# Patient Record
Sex: FEMALE | Race: WHITE | NOT HISPANIC OR LATINO | Employment: STUDENT | URBAN - NONMETROPOLITAN AREA
[De-identification: names, ages, dates, MRNs, and addresses within clinical notes are randomized per-mention and may not be internally consistent; named-entity substitution may affect disease eponyms.]

---

## 2021-05-28 ENCOUNTER — IMMUNIZATION (OUTPATIENT)
Dept: VACCINE CLINIC | Facility: HOSPITAL | Age: 21
End: 2021-05-28

## 2021-05-28 PROCEDURE — 91300 HC SARSCOV02 VAC 30MCG/0.3ML IM: CPT | Performed by: INTERNAL MEDICINE

## 2021-05-28 PROCEDURE — 0001A: CPT | Performed by: INTERNAL MEDICINE

## 2021-06-15 ENCOUNTER — IMMUNIZATION (OUTPATIENT)
Dept: VACCINE CLINIC | Facility: HOSPITAL | Age: 21
End: 2021-06-15

## 2021-06-15 PROCEDURE — 91300 HC SARSCOV02 VAC 30MCG/0.3ML IM: CPT | Performed by: INTERNAL MEDICINE

## 2021-06-15 PROCEDURE — 0002A: CPT | Performed by: INTERNAL MEDICINE

## 2023-08-08 ENCOUNTER — OFFICE VISIT (OUTPATIENT)
Dept: FAMILY MEDICINE CLINIC | Facility: CLINIC | Age: 23
End: 2023-08-08
Payer: COMMERCIAL

## 2023-08-08 VITALS
SYSTOLIC BLOOD PRESSURE: 130 MMHG | WEIGHT: 188.8 LBS | HEART RATE: 92 BPM | TEMPERATURE: 98.2 F | OXYGEN SATURATION: 97 % | HEIGHT: 63 IN | BODY MASS INDEX: 33.45 KG/M2 | DIASTOLIC BLOOD PRESSURE: 80 MMHG

## 2023-08-08 DIAGNOSIS — R19.7 DIARRHEA, UNSPECIFIED TYPE: Primary | ICD-10-CM

## 2023-08-08 PROCEDURE — 86258 DGP ANTIBODY EACH IG CLASS: CPT | Performed by: FAMILY MEDICINE

## 2023-08-08 PROCEDURE — 99204 OFFICE O/P NEW MOD 45 MIN: CPT | Performed by: FAMILY MEDICINE

## 2023-08-08 PROCEDURE — 86364 TISS TRNSGLTMNASE EA IG CLAS: CPT | Performed by: FAMILY MEDICINE

## 2023-08-08 PROCEDURE — 82306 VITAMIN D 25 HYDROXY: CPT | Performed by: FAMILY MEDICINE

## 2023-08-08 PROCEDURE — 83013 H PYLORI (C-13) BREATH: CPT | Performed by: FAMILY MEDICINE

## 2023-08-08 PROCEDURE — 80050 GENERAL HEALTH PANEL: CPT | Performed by: FAMILY MEDICINE

## 2023-08-08 PROCEDURE — 36415 COLL VENOUS BLD VENIPUNCTURE: CPT | Performed by: FAMILY MEDICINE

## 2023-08-08 PROCEDURE — 83036 HEMOGLOBIN GLYCOSYLATED A1C: CPT | Performed by: FAMILY MEDICINE

## 2023-08-08 PROCEDURE — 84439 ASSAY OF FREE THYROXINE: CPT | Performed by: FAMILY MEDICINE

## 2023-08-08 PROCEDURE — 82784 ASSAY IGA/IGD/IGG/IGM EACH: CPT | Performed by: FAMILY MEDICINE

## 2023-08-08 PROCEDURE — 83540 ASSAY OF IRON: CPT | Performed by: FAMILY MEDICINE

## 2023-08-08 PROCEDURE — 82728 ASSAY OF FERRITIN: CPT | Performed by: FAMILY MEDICINE

## 2023-08-08 PROCEDURE — 84466 ASSAY OF TRANSFERRIN: CPT | Performed by: FAMILY MEDICINE

## 2023-08-08 RX ORDER — TRIAMCINOLONE ACETONIDE 1 MG/G
1 CREAM TOPICAL 2 TIMES DAILY
Qty: 45 G | Refills: 1 | Status: SHIPPED | OUTPATIENT
Start: 2023-08-08

## 2023-08-09 LAB
25(OH)D3 SERPL-MCNC: 40 NG/ML (ref 30–100)
ALBUMIN SERPL-MCNC: 4.4 G/DL (ref 3.5–5.2)
ALBUMIN/GLOB SERPL: 1.8 G/DL
ALP SERPL-CCNC: 68 U/L (ref 39–117)
ALT SERPL W P-5'-P-CCNC: 38 U/L (ref 1–33)
ANION GAP SERPL CALCULATED.3IONS-SCNC: 10 MMOL/L (ref 5–15)
AST SERPL-CCNC: 57 U/L (ref 1–32)
BASOPHILS # BLD AUTO: 0.04 10*3/MM3 (ref 0–0.2)
BASOPHILS NFR BLD AUTO: 0.5 % (ref 0–1.5)
BILIRUB SERPL-MCNC: <0.2 MG/DL (ref 0–1.2)
BUN SERPL-MCNC: 14 MG/DL (ref 6–20)
BUN/CREAT SERPL: 17.5 (ref 7–25)
CALCIUM SPEC-SCNC: 9.4 MG/DL (ref 8.6–10.5)
CHLORIDE SERPL-SCNC: 103 MMOL/L (ref 98–107)
CO2 SERPL-SCNC: 26 MMOL/L (ref 22–29)
CREAT SERPL-MCNC: 0.8 MG/DL (ref 0.57–1)
DEPRECATED RDW RBC AUTO: 39.9 FL (ref 37–54)
EGFRCR SERPLBLD CKD-EPI 2021: 106.3 ML/MIN/1.73
EOSINOPHIL # BLD AUTO: 0.13 10*3/MM3 (ref 0–0.4)
EOSINOPHIL NFR BLD AUTO: 1.6 % (ref 0.3–6.2)
ERYTHROCYTE [DISTWIDTH] IN BLOOD BY AUTOMATED COUNT: 12 % (ref 12.3–15.4)
FERRITIN SERPL-MCNC: 30.6 NG/ML (ref 13–150)
GLOBULIN UR ELPH-MCNC: 2.5 GM/DL
GLUCOSE SERPL-MCNC: 78 MG/DL (ref 65–99)
HBA1C MFR BLD: 5.1 % (ref 4.8–5.6)
HCT VFR BLD AUTO: 40.7 % (ref 34–46.6)
HGB BLD-MCNC: 13.7 G/DL (ref 12–15.9)
IMM GRANULOCYTES # BLD AUTO: 0.02 10*3/MM3 (ref 0–0.05)
IMM GRANULOCYTES NFR BLD AUTO: 0.2 % (ref 0–0.5)
IRON 24H UR-MRATE: 54 MCG/DL (ref 37–145)
IRON SATN MFR SERPL: 12 % (ref 20–50)
LYMPHOCYTES # BLD AUTO: 1.57 10*3/MM3 (ref 0.7–3.1)
LYMPHOCYTES NFR BLD AUTO: 19 % (ref 19.6–45.3)
MCH RBC QN AUTO: 30.5 PG (ref 26.6–33)
MCHC RBC AUTO-ENTMCNC: 33.7 G/DL (ref 31.5–35.7)
MCV RBC AUTO: 90.6 FL (ref 79–97)
MONOCYTES # BLD AUTO: 0.45 10*3/MM3 (ref 0.1–0.9)
MONOCYTES NFR BLD AUTO: 5.5 % (ref 5–12)
NEUTROPHILS NFR BLD AUTO: 6.04 10*3/MM3 (ref 1.7–7)
NEUTROPHILS NFR BLD AUTO: 73.2 % (ref 42.7–76)
NRBC BLD AUTO-RTO: 0 /100 WBC (ref 0–0.2)
PLATELET # BLD AUTO: 278 10*3/MM3 (ref 140–450)
PMV BLD AUTO: 12.5 FL (ref 6–12)
POTASSIUM SERPL-SCNC: 4.1 MMOL/L (ref 3.5–5.2)
PROT SERPL-MCNC: 6.9 G/DL (ref 6–8.5)
RBC # BLD AUTO: 4.49 10*6/MM3 (ref 3.77–5.28)
SODIUM SERPL-SCNC: 139 MMOL/L (ref 136–145)
T4 FREE SERPL-MCNC: 1.05 NG/DL (ref 0.93–1.7)
TIBC SERPL-MCNC: 454 MCG/DL (ref 298–536)
TRANSFERRIN SERPL-MCNC: 305 MG/DL (ref 200–360)
TSH SERPL DL<=0.05 MIU/L-ACNC: 1.67 UIU/ML (ref 0.27–4.2)
WBC NRBC COR # BLD: 8.25 10*3/MM3 (ref 3.4–10.8)

## 2023-08-10 LAB
GLIADIN PEPTIDE IGA SER-ACNC: 9 UNITS (ref 0–19)
IGA SERPL-MCNC: 133 MG/DL (ref 87–352)
TTG IGA SER-ACNC: <2 U/ML (ref 0–3)

## 2023-08-10 NOTE — PROGRESS NOTES
"Chief Complaint  Establish Care    Subjective          Jodi Grayson presents to [facility]  Diarrhea   This is a chronic problem. The current episode started more than 1 year ago. The problem occurs 2 to 4 times per day. The problem has been unchanged. The stool consistency is described as Watery. Associated symptoms include abdominal pain. Pertinent negatives include no weight loss. Associated symptoms comments: States she has had diarrhea for greater than 4+ years. She first thought it may be the stress from cheer making it worse. However she states cheer has ended and she is still having diarrhea. This is a daily issue and does worsen at times. She still has a gallbladder and has never had this checked. States she does get stomach cramps at times with it. Cheese and greasy foods to seem to make it worse at times. . She has tried anti-motility drug for the symptoms. The treatment provided moderate relief.   Rash  This is a new problem. The affected locations include the face. The rash is characterized by redness. She was exposed to nothing. Associated symptoms include diarrhea. Past treatments include nothing.     Review of Systems   Constitutional:  Negative for weight loss.   Gastrointestinal:  Positive for abdominal pain and diarrhea.   Skin:  Positive for rash.       Objective   Vital Signs:   /80 (BP Location: Right arm, Patient Position: Sitting, Cuff Size: Adult)   Pulse 92   Temp 98.2 øF (36.8 øC) (Temporal)   Ht 160 cm (63\")   Wt 85.6 kg (188 lb 12.8 oz)   SpO2 97%   BMI 33.44 kg/mý     Physical Exam  Constitutional:       General: She is not in acute distress.     Appearance: Normal appearance. She is well-developed and well-groomed. She is not ill-appearing, toxic-appearing or diaphoretic.   HENT:      Head: Normocephalic.      Nose: Nose normal. No congestion or rhinorrhea.      Mouth/Throat:      Mouth: Mucous membranes are moist.      Pharynx: " Oropharynx is clear. No oropharyngeal exudate or posterior oropharyngeal erythema.   Eyes:      General: Lids are normal.         Right eye: No discharge.         Left eye: No discharge.      Extraocular Movements: Extraocular movements intact.      Pupils: Pupils are equal, round, and reactive to light.   Neck:      Vascular: No carotid bruit.   Cardiovascular:      Rate and Rhythm: Normal rate and regular rhythm.      Pulses: Normal pulses.      Heart sounds: Normal heart sounds. No murmur heard.    No friction rub. No gallop.   Pulmonary:      Effort: Pulmonary effort is normal. No respiratory distress.      Breath sounds: Normal breath sounds. No stridor. No wheezing, rhonchi or rales.   Chest:      Chest wall: No tenderness.   Abdominal:      General: Bowel sounds are normal. There is no distension.      Palpations: Abdomen is soft. There is no mass.      Tenderness: There is no abdominal tenderness. There is no right CVA tenderness, left CVA tenderness, guarding or rebound.      Hernia: No hernia is present.   Musculoskeletal:         General: No swelling or tenderness. Normal range of motion.      Cervical back: Normal range of motion and neck supple. No rigidity or tenderness.      Right lower leg: No edema.      Left lower leg: No edema.   Lymphadenopathy:      Cervical: No cervical adenopathy.   Skin:     General: Skin is warm.      Capillary Refill: Capillary refill takes less than 2 seconds.      Coloration: Skin is not jaundiced.      Findings: Rash present. No bruising or erythema.   Neurological:      General: No focal deficit present.      Mental Status: She is alert and oriented to person, place, and time.      Motor: Motor function is intact. No weakness.      Coordination: Coordination is intact.      Gait: Gait is intact. Gait normal.   Psychiatric:         Attention and Perception: Attention normal.         Mood and Affect: Mood normal.         Speech: Speech normal.         Behavior: Behavior  normal.         Cognition and Memory: Cognition normal.         Judgment: Judgment normal.      Result Review :                 Assessment and Plan    Diagnoses and all orders for this visit:    1. Diarrhea, unspecified type (Primary)  -     CBC Auto Differential; Future  -     Comprehensive Metabolic Panel; Future  -     Hemoglobin A1c; Future  -     TSH; Future  -     T4, Free; Future  -     Iron Profile; Future  -     Ferritin; Future  -     H. Pylori Breath Test - Breath, Lung; Future  -     Celiac Panel Reflex To Titer; Future  -     Vitamin D,25-Hydroxy; Future  -     CBC Auto Differential  -     Comprehensive Metabolic Panel  -     Hemoglobin A1c  -     TSH  -     T4, Free  -     Iron Profile  -     Ferritin  -     H. Pylori Breath Test - Breath, Lung  -     Celiac Panel Reflex To Titer  -     Vitamin D,25-Hydroxy    Other orders  -     triamcinolone (KENALOG) 0.1 % cream; Apply 1 application  topically to the appropriate area as directed 2 (Two) Times a Day.  Dispense: 45 g; Refill: 1      Patient's Body mass index is 33.44 kg/mý. indicating that she is obese (BMI >30). Obesity-related health conditions include the following: none. Obesity is unchanged. BMI is is above average; BMI management plan is completed. We discussed low calorie, low carb based diet program, portion control, and increasing exercise..    Follow Up   Return in about 1 week (around 8/15/2023), or labs today.  Patient was given instructions and counseling regarding her condition or for health maintenance advice. Please see specific information pulled into the AVS if appropriate.     This document has been electronically signed by FIGUEROA Lin  August 10, 2023 11:02 EDT

## 2023-08-11 LAB — UREA BREATH TEST QL: NORMAL

## 2023-08-15 ENCOUNTER — TELEMEDICINE (OUTPATIENT)
Dept: FAMILY MEDICINE CLINIC | Facility: CLINIC | Age: 23
End: 2023-08-15
Payer: COMMERCIAL

## 2023-08-15 ENCOUNTER — TELEPHONE (OUTPATIENT)
Dept: FAMILY MEDICINE CLINIC | Facility: CLINIC | Age: 23
End: 2023-08-15

## 2023-08-15 DIAGNOSIS — E61.1 IRON DEFICIENCY: ICD-10-CM

## 2023-08-15 DIAGNOSIS — R10.84 GENERALIZED ABDOMINAL PAIN: Primary | ICD-10-CM

## 2023-08-15 PROCEDURE — 99214 OFFICE O/P EST MOD 30 MIN: CPT | Performed by: FAMILY MEDICINE

## 2023-08-15 RX ORDER — FERROUS SULFATE 325(65) MG
325 TABLET ORAL
Qty: 30 TABLET | Refills: 2 | Status: SHIPPED | OUTPATIENT
Start: 2023-08-15

## 2023-08-15 NOTE — PROGRESS NOTES
Jodi Grayson     VITALS: There were no vitals taken for this visit.    Subjective  Chief Complaint: No chief complaint on file.       History of Present Illness:  You have chosen to receive care through a telehealth visit.  Do you consent to use a video/audio connection for your medical care today? Yes    The use of a video visit has been reviewed with the patient and verbal informed consent has been obtained.    This visit was conducted with the use of a interactive audio and video telecommunication system that permits real-time communication between patient and provider.  Patient consent for this virtual visit was obtained before the visit.  The patient was at their home under the 2020 Coronavirus Preparedness and Respond  Supplemental Appropriation Act.  Provider was at the office.    Subjective            Patient is a 22 y.o.  female  who presents to a visit secondary to medical followup. Labs reviewed with patient. Was not able to complete H pylori test, will need to redraw. Also discussed her low iron will send in a iron supplement. We will proceed with abdominal u/s. She will be moving into her dorm in the morning, we will schedule for [facility].     No complaints about any of the medications.    The following portions of the patient's history were reviewed and updated as appropriate: allergies, current medications, past family history, past medical history, past social history, past surgical history and problem list.    Past Medical History  No past medical history on file.    Review of Systems    Surgical History  No past surgical history on file.    Family History  Family History   Problem Relation Age of Onset    Hypertension Father     Cancer Maternal Grandmother        Social History  Social History     Socioeconomic History    Marital status: Single   Tobacco Use    Smoking status: Never    Smokeless tobacco: Never   Substance and Sexual Activity    Alcohol use: Never    Drug use: Never     Sexual activity: Defer       Objective  Physical Exam   HENT:   Head: Normocephalic.   Eyes: Pupils are equal, round, and reactive to light.   Abdominal: Abdomen appears normal. Soft.   Neurological: She is alert.   Psychiatric: She has a normal mood and affect.     Limited exam due to telehealth visit.    Procedures    Result Review :   The following data was reviewed by: FIGUEROA Lin on 08/15/2023:  Common labs          8/8/2023    16:26   Common Labs   Glucose 78    BUN 14    Creatinine 0.80    Sodium 139    Potassium 4.1    Chloride 103    Calcium 9.4    Albumin 4.4    Total Bilirubin <0.2    Alkaline Phosphatase 68    AST (SGOT) 57    ALT (SGPT) 38    WBC 8.25    Hemoglobin 13.7    Hematocrit 40.7    Platelets 278    Hemoglobin A1C 5.10                 Assessment and Plan    Jodi Grayson is a 22 y.o. here for an acute concern.    Problem List Items Addressed This Visit    None  Visit Diagnoses       Generalized abdominal pain    -  Primary    Relevant Orders    US Abdomen Complete    Iron deficiency        Relevant Medications    ferrous sulfate (FerrouSul) 325 (65 FE) MG tablet          Findings and plans discussed with patient who verbalizes understanding and agreement. Will followup with patient once results are in. Patient to followup at clinic PRN or in 3 months for further medical followup.    Patient's There is no height or weight on file to calculate BMI. indicating that she is obese (BMI >30). Obesity-related health conditions include the following: none. Obesity is unchanged. BMI is is above average; BMI management plan is completed. We discussed low calorie, low carb based diet program, portion control, and increasing exercise..      Follow Up   No follow-ups on file.  Findings and plans discussed with patient who verbalizes understanding and agreement. Will followup with patient once results are in. Patient was given instructions and counseling regarding her condition or for health  maintenance advice. Please see specific information pulled into the AVS if appropriate.       FIGUEROA Lin      This document has been electronically signed by FIGUEROA Lin  August 15, 2023 14:54 EDT     EMR Dragon/Transcription Disclaimer:  Much of this encounter note is an electronic transcription/translation of spoken language to printed text.

## 2023-08-15 NOTE — TELEPHONE ENCOUNTER
Called pt to left her know cristobal was running behind let her know to join back in 30 min verbal understanding.

## 2023-09-06 ENCOUNTER — TELEPHONE (OUTPATIENT)
Dept: FAMILY MEDICINE CLINIC | Facility: CLINIC | Age: 23
End: 2023-09-06
Payer: COMMERCIAL

## 2023-09-06 NOTE — TELEPHONE ENCOUNTER
Attempted to contact the pt in regards to an overdue US of the Complete Abdomen. The pt did not answer. I left a message for the pt to return the call.

## 2023-09-07 NOTE — TELEPHONE ENCOUNTER
Attempted to contact the pt in regards to overdue US of Abdomen Complete. The pt did not answer. I will send out a letter.

## 2023-09-29 ENCOUNTER — HOSPITAL ENCOUNTER (OUTPATIENT)
Dept: ULTRASOUND IMAGING | Facility: HOSPITAL | Age: 23
Discharge: HOME | End: 2023-09-29
Admitting: FAMILY MEDICINE
Payer: COMMERCIAL

## 2023-09-29 DIAGNOSIS — R10.84 GENERALIZED ABDOMINAL PAIN: ICD-10-CM

## 2023-09-29 PROCEDURE — 76700 US EXAM ABDOM COMPLETE: CPT

## 2023-10-02 ENCOUNTER — TELEPHONE (OUTPATIENT)
Dept: FAMILY MEDICINE CLINIC | Facility: CLINIC | Age: 23
End: 2023-10-02
Payer: COMMERCIAL

## 2023-10-02 NOTE — TELEPHONE ENCOUNTER
----- Message from FIGUEROA Lin sent at 10/2/2023  9:13 AM EDT -----  Please let patient know results are normal. Thank you.     Left a message to return call.    Hub to read.

## 2023-10-02 NOTE — TELEPHONE ENCOUNTER
----- Message from FIGUEROA Lin sent at 10/2/2023  9:13 AM EDT -----  Please let patient know results are normal. Thank you.

## 2023-10-03 NOTE — TELEPHONE ENCOUNTER
----- Message from FIGUEROA Lin sent at 10/2/2023  9:13 AM EDT -----  Please let patient know results are normal. Thank you.     Left a message to return call.    Hub to read.    Patient notified.

## 2024-02-01 DIAGNOSIS — R10.84 GENERALIZED ABDOMINAL PAIN: Primary | ICD-10-CM

## 2024-02-02 ENCOUNTER — LAB (OUTPATIENT)
Dept: FAMILY MEDICINE CLINIC | Facility: CLINIC | Age: 24
End: 2024-02-02
Payer: COMMERCIAL

## 2024-02-02 DIAGNOSIS — R10.84 GENERALIZED ABDOMINAL PAIN: ICD-10-CM

## 2024-02-02 PROCEDURE — 83013 H PYLORI (C-13) BREATH: CPT | Performed by: FAMILY MEDICINE

## 2024-02-05 ENCOUNTER — TELEPHONE (OUTPATIENT)
Dept: FAMILY MEDICINE CLINIC | Facility: CLINIC | Age: 24
End: 2024-02-05
Payer: COMMERCIAL

## 2024-02-05 LAB — UREA BREATH TEST QL: NEGATIVE

## 2024-02-05 NOTE — TELEPHONE ENCOUNTER
----- Message from FIGUEROA Lin sent at 2/5/2024 11:25 AM EST -----  Please let patient know results are normal. Thank you.